# Patient Record
Sex: FEMALE | URBAN - METROPOLITAN AREA
[De-identification: names, ages, dates, MRNs, and addresses within clinical notes are randomized per-mention and may not be internally consistent; named-entity substitution may affect disease eponyms.]

---

## 2021-02-01 ENCOUNTER — HOSPITAL ENCOUNTER (INPATIENT)
Age: 31
LOS: 3 days | Discharge: HOME | DRG: 885 | End: 2021-02-04
Attending: PSYCHIATRY & NEUROLOGY | Admitting: PSYCHIATRY & NEUROLOGY
Payer: COMMERCIAL

## 2021-02-01 PROBLEM — F32.A DEPRESSION WITH SUICIDAL IDEATION: Status: ACTIVE | Noted: 2021-02-01

## 2021-02-01 PROBLEM — R45.851 DEPRESSION WITH SUICIDAL IDEATION: Status: ACTIVE | Noted: 2021-02-01

## 2021-02-01 PROCEDURE — 1240000000 HC EMOTIONAL WELLNESS R&B

## 2021-02-01 PROCEDURE — 6370000000 HC RX 637 (ALT 250 FOR IP): Performed by: PSYCHIATRY & NEUROLOGY

## 2021-02-01 RX ORDER — ACETAMINOPHEN 325 MG/1
650 TABLET ORAL EVERY 4 HOURS PRN
Status: DISCONTINUED | OUTPATIENT
Start: 2021-02-01 | End: 2021-02-04 | Stop reason: HOSPADM

## 2021-02-01 RX ORDER — ARIPIPRAZOLE 5 MG/1
5 TABLET ORAL DAILY
Status: DISCONTINUED | OUTPATIENT
Start: 2021-02-01 | End: 2021-02-04 | Stop reason: HOSPADM

## 2021-02-01 RX ORDER — NICOTINE 21 MG/24HR
1 PATCH, TRANSDERMAL 24 HOURS TRANSDERMAL DAILY
Status: DISCONTINUED | OUTPATIENT
Start: 2021-02-01 | End: 2021-02-03

## 2021-02-01 RX ORDER — OMEPRAZOLE 20 MG/1
20 CAPSULE, DELAYED RELEASE ORAL DAILY
COMMUNITY

## 2021-02-01 RX ORDER — MAGNESIUM HYDROXIDE/ALUMINUM HYDROXICE/SIMETHICONE 120; 1200; 1200 MG/30ML; MG/30ML; MG/30ML
30 SUSPENSION ORAL EVERY 6 HOURS PRN
Status: DISCONTINUED | OUTPATIENT
Start: 2021-02-01 | End: 2021-02-04 | Stop reason: HOSPADM

## 2021-02-01 RX ORDER — OMEPRAZOLE 20 MG/1
20 CAPSULE, DELAYED RELEASE ORAL DAILY
Status: DISCONTINUED | OUTPATIENT
Start: 2021-02-01 | End: 2021-02-04 | Stop reason: HOSPADM

## 2021-02-01 RX ORDER — IBUPROFEN 400 MG/1
400 TABLET ORAL EVERY 6 HOURS PRN
Status: DISCONTINUED | OUTPATIENT
Start: 2021-02-01 | End: 2021-02-04 | Stop reason: HOSPADM

## 2021-02-01 RX ORDER — LISINOPRIL 10 MG/1
10 TABLET ORAL DAILY
Status: DISCONTINUED | OUTPATIENT
Start: 2021-02-01 | End: 2021-02-04 | Stop reason: HOSPADM

## 2021-02-01 RX ORDER — POLYETHYLENE GLYCOL 3350 17 G/17G
17 POWDER, FOR SOLUTION ORAL DAILY PRN
Status: DISCONTINUED | OUTPATIENT
Start: 2021-02-01 | End: 2021-02-04 | Stop reason: HOSPADM

## 2021-02-01 RX ORDER — HYDROXYZINE 50 MG/1
50 TABLET, FILM COATED ORAL 3 TIMES DAILY PRN
Status: DISCONTINUED | OUTPATIENT
Start: 2021-02-01 | End: 2021-02-04 | Stop reason: HOSPADM

## 2021-02-01 RX ORDER — LISINOPRIL 10 MG/1
10 TABLET ORAL DAILY
COMMUNITY

## 2021-02-01 RX ORDER — PAROXETINE 30 MG/1
30 TABLET, FILM COATED ORAL EVERY MORNING
Status: ON HOLD | COMMUNITY
End: 2021-02-04 | Stop reason: HOSPADM

## 2021-02-01 RX ORDER — TRAZODONE HYDROCHLORIDE 50 MG/1
50 TABLET ORAL NIGHTLY PRN
Status: DISCONTINUED | OUTPATIENT
Start: 2021-02-01 | End: 2021-02-04 | Stop reason: HOSPADM

## 2021-02-01 RX ORDER — ARIPIPRAZOLE 5 MG/1
5 TABLET ORAL DAILY
COMMUNITY

## 2021-02-01 RX ADMIN — TRAZODONE HYDROCHLORIDE 50 MG: 50 TABLET ORAL at 21:22

## 2021-02-01 RX ADMIN — ARIPIPRAZOLE 5 MG: 5 TABLET ORAL at 17:24

## 2021-02-01 RX ADMIN — HYDROXYZINE HYDROCHLORIDE 50 MG: 50 TABLET, FILM COATED ORAL at 21:22

## 2021-02-01 RX ADMIN — LISINOPRIL 10 MG: 10 TABLET ORAL at 17:24

## 2021-02-01 SDOH — HEALTH STABILITY: MENTAL HEALTH: HOW OFTEN DO YOU HAVE A DRINK CONTAINING ALCOHOL?: NEVER

## 2021-02-01 ASSESSMENT — SLEEP AND FATIGUE QUESTIONNAIRES
DO YOU USE A SLEEP AID: NO
DIFFICULTY STAYING ASLEEP: YES
SLEEP PATTERN: DISTURBED/INTERRUPTED SLEEP;RESTLESSNESS
RESTFUL SLEEP: NO
DO YOU HAVE DIFFICULTY SLEEPING: YES
DIFFICULTY ARISING: YES
DIFFICULTY FALLING ASLEEP: YES

## 2021-02-01 ASSESSMENT — LIFESTYLE VARIABLES: HISTORY_ALCOHOL_USE: NO

## 2021-02-01 NOTE — BH NOTE
`[facility] [place]  Admission Note     Admission Type:   Admission Type: Voluntary    Reason for admission:  Reason for Admission: suicidal ideations    PATIENT STRENGTHS:  Strengths: Communication, Positive Support, Social Skills, Motivated, Employment, Medication Compliance, Connection to output provider    Patient Strengths and Limitations:  Limitations: Tendency to isolate self    Addictive Behavior:   Addictive Behavior  In the past 3 months, have you felt or has someone told you that you have a problem with:  : None  Do you have a history of Chemical Use?: No  Do you have a history of Alcohol Use?: No  Do you have a history of Street Drug Abuse?: No  Histroy of Prescripton Drug Abuse?: No    Medical Problems:   Past Medical History:   Diagnosis Date    GERD (gastroesophageal reflux disease)     Hypertension        Status EXAM:  Status and Exam  Normal: No  Facial Expression: Flat  Affect: Appropriate  Level of Consciousness: Alert  Mood:Normal: No  Mood: Depressed, Anxious  Motor Activity:Normal: No  Motor Activity: Decreased  Interview Behavior: Cooperative  Preception: Macy to Person, Macy to Time, Macy to Place, Macy to Situation  Attention:Normal: No  Attention: Distractible  Thought Processes: Circumstantial  Thought Content:Normal: Yes  Hallucinations: None  Delusions: No  Memory:Normal: Yes  Insight and Judgment: No  Insight and Judgment: Poor Insight, Poor Judgment  Present Suicidal Ideation: No  Present Homicidal Ideation: No    Tobacco Screening:  Practical Counseling, on admission, toni X, if applicable and completed (first 3 are required if patient doesn't refuse):            ( )  Recognizing danger situations (included triggers and roadblocks)                    ( )  Coping skills (new ways to manage stress, exercise, relaxation techniques, changing routine, distraction) ( )  Basic information about quitting (benefits of quitting, techniques in how to quit, available resources  ( ) Referral for counseling faxed to [place]                                           ( ) Patient refused counseling  ( ) Patient has not smoked in the last 30 days    Metabolic Screening:    No results found for: LABA1C    No results found for: CHOL  No results found for: TRIG  No results found for: HDL  No components found for: LDLCAL  No results found for: LABVLDL      Body mass index is 46.21 kg/m². BP Readings from Last 2 Encounters:   02/01/21 (!) 140/90           Pt admitted with followings belongings:  Dentures: None  Vision - Corrective Lenses: None  Hearing Aid: None  Jewelry: Watch  Body Piercings Removed: N/A  Clothing: Footwear, Jacket / coat, Pants, Shirt, Socks, Undergarments (Comment)(belt, face mask)  Were All Patient Medications Collected?: Not Applicable  Other Valuables: Cell phone, Money (Comment), Desiree Calico, Other (Comment)     Valuables sent home with n/a. Valuables placed in safe in security envelope, number:  [id number]. Patient's home medications were verified by [address] [place] [address] [place] in . Patient oriented to surroundings and program expectations and copy of patient rights given. Received admission packet:  yes. Consents reviewed, signed yes. Refused no. Patient verbalize understanding:  yes. Patient education on precautions: yes  Patient admitted [facility] unit from [facility]. Patient admitted for suicidal ideations with plan to overdose on her medications. Upon admission patient with flat affect. Denies suicidal/homicidal/self harm ideations but endorses depressed. anxious mood. Patient verbalizes increased depression from a 14 year friendship that ended. Patient wanded and oriented to unit and assigned room. Food and fluids provided. Safety maintained.                     Cheyenne Tarango RN

## 2021-02-01 NOTE — GROUP NOTE
Group Therapy Note    Date: 2/1/2021    Group Start Time: 1430  Group End Time: 0451  Group Topic: Cognitive Skills    CALEB Castellano, MARCYS    Pt did not attend 1430 cognitive skills group d/t resting in room despite staff invitation to attend. 1:1 talk time offered as alternative to group session, pt declined.               Signature:  Gerda Gabriel

## 2021-02-01 NOTE — BH NOTE
Group Therapy Note      Date: 2/1/21     Group Start Time: 1600     Group Topic: wellness, coping skills     [facility] D      Patient refused to attend nursing wellness group after encouragement from staff. 1:1 talk time offered but refused.       Discipline Responsible: behavioral health tech

## 2021-02-01 NOTE — LETTER
[address] [facility] [phone]  Phone: [phone]      February 4, 2021     Patient: Juju Castro   YOB: 1990   Date of Visit: 1/31/2021-2/4/2021       To Whom It May Concern: It is my medical opinion that Juju Castro may return to work on 2/5/2021 with the following restrictions: no restrictions. If you have any questions or concerns, please don't hesitate to call.     Sincerely,        Ginger CHAVEZ-CNP

## 2021-02-02 PROCEDURE — 6370000000 HC RX 637 (ALT 250 FOR IP): Performed by: PSYCHIATRY & NEUROLOGY

## 2021-02-02 PROCEDURE — 99223 1ST HOSP IP/OBS HIGH 75: CPT | Performed by: PSYCHIATRY & NEUROLOGY

## 2021-02-02 PROCEDURE — 1240000000 HC EMOTIONAL WELLNESS R&B

## 2021-02-02 RX ADMIN — PAROXETINE HYDROCHLORIDE 30 MG: 20 TABLET, FILM COATED ORAL at 08:21

## 2021-02-02 RX ADMIN — LISINOPRIL 10 MG: 10 TABLET ORAL at 08:22

## 2021-02-02 RX ADMIN — OMEPRAZOLE 20 MG: 20 CAPSULE, DELAYED RELEASE ORAL at 08:22

## 2021-02-02 RX ADMIN — IBUPROFEN 400 MG: 400 TABLET, FILM COATED ORAL at 04:48

## 2021-02-02 RX ADMIN — ARIPIPRAZOLE 5 MG: 5 TABLET ORAL at 08:22

## 2021-02-02 ASSESSMENT — PAIN SCALES - GENERAL: PAINLEVEL_OUTOF10: 8
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
## 2021-02-02 NOTE — GROUP NOTE
Group Therapy Note    Date: 2/2/2021    Group Start Time: 1349  Group End Time: 0915  Group Topic: Community Meeting    CALEB Brizuela [place]        Group Therapy Note    Attendees: 9/17         Patient's Goal:  To increase social interaction and to explore and identify short term goals r/t wellness and recovery. RT discussed group schedule and unit structure/resources and encouraged pts to be engaged in their treatment. Pts were given opportunities to ask questions. Notes: Pt participated in group task and was able to identify short term goals r/t wellness and recovery. Pt is pleasant and supportive of peers        Status After Intervention:  Improved     Participation Level:  Active Listener and Interactive     Participation Quality: Appropriate, Attentive, Sharing         Speech:   Normal      Thought Process/Content: Logical,linear     Affective Functioning: Blunted     Mood: euthymic      Level of consciousness:  Alert, and Attentive        Response to Learning: Able to verbalize current knowledge/experience, Able to verbalize/acknowledge new learning, and Progressing to goal        Endings: None Reported     Modes of Intervention: Education, Support, Socialization, Exploration, Clarifying and Problem-solving        Discipline Responsible: Psychoeducational Specialist        Signature:  Linda Walls

## 2021-02-02 NOTE — GROUP NOTE
Group Therapy Note    Date: 2/2/2021    Group Start Time: 1100  Group End Time: 1952  Group Topic: Cognitive Skills    CALEB Rivera, CTRS        Group Therapy Note    Attendees: 10/18       Pt did not participate in Cognitive Skills Group at 1100AM when encouraged by RT due to resting in room. Pt was offered talk time as an alternative to group but declined.        Discipline Responsible: Psychoeducational Specialist      Signature:  Sherron Gordon
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
## 2021-02-02 NOTE — GROUP NOTE
Group Therapy Note    Date: 2/2/2021    Group Start Time: 1330  Group End Time: 2795  Group Topic: Cognitive Skills    YVES Raymond      Pt did not attend 1330 cognitive skills group d/t resting in room despite staff invitation to attend. 1:1 talk time offered as alternative to group session, pt declined.             Signature:  Miller Guzman

## 2021-02-02 NOTE — PLAN OF CARE
PATIENT GOALS: to be discussed with patient within 72 hours    PLAN/TREATMENT RECOMMENDATIONS:     continue group therapy , medications compliance, goal setting, individualized assessments and care, continue to monitor pt on unit      SHORT-TERM GOALS:   Time frame for Short-Term Goals: 5-7 days    LONG-TERM GOALS:  Time frame for Long-Term Goals: 6 months  Members Present in Team Meeting: See Signature Sheet    LENARD Hammonds

## 2021-02-02 NOTE — PLAN OF CARE
Problem: Altered Mood, Depressive Behavior:  Goal: Able to verbalize and/or display a decrease in depressive symptoms  Description: Able to verbalize and/or display a decrease in depressive symptoms  Outcome: Ongoing     Problem: Altered Mood, Depressive Behavior:  Goal: Ability to disclose and discuss suicidal ideas will improve  Description: Ability to disclose and discuss suicidal ideas will improve  Outcome: Ongoing  Note: Denies wanting to hurt self of others at this time. States she came in because she had the thought and it scared her. She is glad to be getting help.      Problem: Altered Mood, Depressive Behavior:  Goal: Able to verbalize support systems  Description: Able to verbalize support systems  Outcome: Ongoing

## 2021-02-02 NOTE — GROUP NOTE
Group Therapy Note    Date: 2/2/2021    Group Start Time: 1430  Group End Time: 0697  Group Topic: Cognitive Skills    CALEB Felipe, MARCYS        Group Therapy Note    Attendees: 8/14         Pt did not participate in Cognitive Skills Group at 1430 when encouraged by RT due to resting in room. Pt was offered talk time as an alternative to group but declined.        Discipline Responsible: Psychoeducational Specialist      Signature:  Marion Lopes

## 2021-02-02 NOTE — SUICIDE SAFETY PLAN
SAFETY PLAN    A suicide Safety Plan is a document that supports someone when they are having thoughts of suicide. Warning Signs that indicate a suicidal crisis may be developing: What (situations, thoughts, feelings, body sensations, behaviors, etc.) do you experience that lets you know you are beginning to think about suicide? 1. Go off medications  2. Mood is depressed and start to feel sad, hopeless, helpless, guilty, decline in self-esteem, excess worry, no interest in doing any pleasurable activities, unable to concentrate  3. Begin to cry over the smallest of things  4. Not eating or sleeping as normal  5. Relationship issues start happening  6. I become angry and start a fight  7. When I dont listen or respond to people in a good, positive way  8. Increase drug use   Internal Coping Strategies:  What things can I do (relaxation techniques, hobbies, physical activities, etc.) to take my mind off my problems without contacting another person? 1. Go to hospital discharge appointments and follow-up with community mental health counseling  2. Talk with other people  3. Learn to identify and control your emotions by new ways  4. Think before you speak or act; walk away from the situation  5. Join a support group in person or on Social Media  6. Take a time-out  7. Take deep breaths; use relaxation techniques  8. Get some exercise; go for a walk  9. Read; listen to music; watch a funny movie   People and social settings that provide distraction: Who can I call or where can I go to distract me? Joaquin [phone]   People whom I can ask for help: Who can I call when I need help - for example, friends, family, clergy, someone else?   [place] [facility]  Dr Ophelia Liang,  [phone] Professionals or [address] [address] [facility] [address] I can contact during a crisis: Who can I call for help - for example, my doctor, my psychiatrist, my psychologist, a mental health provider, a suicide hotline? [employer], psychiatrist, [place] or therapist  Phone: [phone]  Suicide Prevention Lifeline: 7-113-713-TLDD (6497)  [facility] 2-1-11 (089) 751-6004 or (838) 281-7026  [facility] [address] [place] [facility], 24-hour Crisis Services, Central Access: (118) [phone], [address] [facility], [address]  KETURAH (National Association of Mental Illness) groups and support, [address] [facility] [address] [phone]  4. [address] [place] [address] Emergency Services -  for example, Community Mental        ? [place], [place] (0736)  ? [facility] 2-1-10 (400) 566-1494 or (580) 672-6789  ? [facility], 843.832.6779 or 9-1-1  ? [facility], [address] De Leon [place], 25 [facility]3] 201 [phone]  ? [facility] Substance Abuse [employer], 5-056-726-HELP (3540)  ? Crisis Text Line, Text 4HOPE to 830041 to connect with a crisis counselor  ? [address] [place] [address], 9-543.185.1253  ? [place] ([place]), [phone]  Making the environment safe: How can I make my environment (house/apartment/living space) safer? For example, can I remove guns, medications, and other items? 1. Throw away all medications not being taken  2.  Plan daily goals to help remember to stay on specific medications

## 2021-02-03 PROCEDURE — 6370000000 HC RX 637 (ALT 250 FOR IP): Performed by: PSYCHIATRY & NEUROLOGY

## 2021-02-03 PROCEDURE — 1240000000 HC EMOTIONAL WELLNESS R&B

## 2021-02-03 PROCEDURE — 99232 SBSQ HOSP IP/OBS MODERATE 35: CPT | Performed by: PSYCHIATRY & NEUROLOGY

## 2021-02-03 RX ORDER — PAROXETINE HYDROCHLORIDE 40 MG/1
40 TABLET, FILM COATED ORAL EVERY MORNING
Status: DISCONTINUED | OUTPATIENT
Start: 2021-02-04 | End: 2021-02-04 | Stop reason: HOSPADM

## 2021-02-03 RX ADMIN — IBUPROFEN 400 MG: 400 TABLET, FILM COATED ORAL at 06:54

## 2021-02-03 RX ADMIN — ARIPIPRAZOLE 5 MG: 5 TABLET ORAL at 07:46

## 2021-02-03 RX ADMIN — OMEPRAZOLE 20 MG: 20 CAPSULE, DELAYED RELEASE ORAL at 07:43

## 2021-02-03 RX ADMIN — IBUPROFEN 400 MG: 400 TABLET, FILM COATED ORAL at 22:48

## 2021-02-03 RX ADMIN — TRAZODONE HYDROCHLORIDE 50 MG: 50 TABLET ORAL at 21:22

## 2021-02-03 RX ADMIN — LISINOPRIL 10 MG: 10 TABLET ORAL at 07:46

## 2021-02-03 RX ADMIN — PAROXETINE HYDROCHLORIDE 30 MG: 20 TABLET, FILM COATED ORAL at 07:43

## 2021-02-03 ASSESSMENT — ENCOUNTER SYMPTOMS
SORE THROAT: 0
COUGH: 0
BACK PAIN: 1
RHINORRHEA: 0
SHORTNESS OF BREATH: 0
GASTROINTESTINAL NEGATIVE: 1

## 2021-02-03 ASSESSMENT — PAIN SCALES - GENERAL
PAINLEVEL_OUTOF10: 5
PAINLEVEL_OUTOF10: 0
PAINLEVEL_OUTOF10: 6

## 2021-02-03 NOTE — PROGRESS NOTES
Daily Progress Note  2/3/2021     CHIEF COMPLAINT: Suicidal ideation with plans to overdose    Reviewed patient's current plan of care and vital signs with nursing staff. Sleep: Patient reports approximately 7 hours last night of restless sleep  Attending groups: Yes    SUBJECTIVE:    Patient has maintained medication adherence and has utilized Motrin as needed related to back pain that she associates with the discomfort of available bed. Patient offers that her mood is improved and she is comfortable in acute care setting and attending milieu programming. Discussed that attending groups has been beneficial and she plans on establishing therapeutic alliance with a counselor at discharge. Discussed the benefits of DBT and offered that [facility] [place] would be an appropriate resource as patient lives at [facility]. Discussed with patient the potential of increasing her dosage of Paxil and while she has been taking this several years and it has been beneficial, she is accepting that long-term use can require adjustment and she is willing to titrate tomorrow morning. Patient offers that after discussion with her , and a family member who works in mental health she questions if her symptoms are related to premenstrual dysphoric disorder, and patient is offered that her current medication paroxetine is FDA approved to treat PMDD. Patient agrees that she will continue to explore this on outpatient basis. She denies having any additional questions or concerns related to her current treatment plan and is forward thinking related to discharge, intending to notify her place of employment that she remains hospitalized with intentions to return to her job as soon as possible.     Mental Status Exam  Level of consciousness:  Within normal limits  Appearance: Personal attire, seated in chair, with good grooming and hygiene   Behavior/Motor: Approachable, no psychomotor abnormalities noted Attitude toward examiner:  Cooperative, attentive, good eye contact  Speech:  spontaneous, normal rate, normal volume and well articulated  Mood: \"Better \"  Affect: Mood congruent  Thought processes:  linear, goal directed and coherent  Thought content:  denies homicidal ideation  Suicidal Ideation: Endorses improved suicidal ideation with no current plan or intent and contracts for safety  Delusions:  no evidence of delusions  Perceptual Disturbance:  denies any perceptual disturbance  Cognition:  Oriented to self, location, time, and situation  Memory: age appropriate  Insight & Judgement: improving  Medication side effects:  denies       Data   height is 5' 3.5\" (1.613 m) and weight is 265 lb (120.2 kg). Her oral temperature is 97.8 °F (36.6 °C). Her blood pressure is 131/91 (abnormal) and her pulse is 96. Her respiration is 18 and oxygen saturation is 99%. Labs:   No results found for any previous visit.             Medications  Current Facility-Administered Medications: [START ON 2/4/2021] PARoxetine (PAXIL) tablet 40 mg, 40 mg, Oral, QAM  acetaminophen (TYLENOL) tablet 650 mg, 650 mg, Oral, Q4H PRN  aluminum & magnesium hydroxide-simethicone (MAALOX) 200-200-20 MG/5ML suspension 30 mL, 30 mL, Oral, Q6H PRN  hydrOXYzine (ATARAX) tablet 50 mg, 50 mg, Oral, TID PRN  ibuprofen (ADVIL;MOTRIN) tablet 400 mg, 400 mg, Oral, Q6H PRN  traZODone (DESYREL) tablet 50 mg, 50 mg, Oral, Nightly PRN  polyethylene glycol (GLYCOLAX) packet 17 g, 17 g, Oral, Daily PRN  nicotine polacrilex (NICORETTE) gum 2 mg, 2 mg, Oral, PRN  nicotine (NICODERM CQ) 14 MG/24HR 1 patch, 1 patch, Transdermal, Daily  ARIPiprazole (ABILIFY) tablet 5 mg, 5 mg, Oral, Daily  lisinopril (PRINIVIL;ZESTRIL) tablet 10 mg, 10 mg, Oral, Daily  omeprazole (PRILOSEC) delayed release capsule 20 mg, 20 mg, Oral, Daily    ASSESSMENT  Depression with suicidal ideation     PLAN  Discussed with Dr. Lysle Lombard and social work team   patient  symptoms are improving Increase paroxetine 40 mg daily (LD 2/3-paroxetine 30 mg)  Attempt to develop insight  Psycho-education conducted. Supportive Therapy conducted. Probable discharge per attending physician  Follow-up daily while on inpatient unit    Electronically signed by SCOTT Ford CNP on 2/3/21 at 12:55 PM EST    **This report has been created using voice recognition software. It may contain minor errors which are inherent in voice recognition technology. **I independently saw and evaluated the patient. I reviewed the midlevel provider's documentation above. Any additional comments or changes to the midlevel provider's documentation are stated below otherwise agree with assessment. The patient reports an improvement in her mood. She continues to be upset about the situation at work. We discussed that she has been taking her current dose of medication for some time. She was agreeable to increasing the dose of her Paxil which has been increased to 40 mg daily. PLAN  Medications as noted above  Attempt to develop insight  Psycho-education conducted. Supportive Therapy conducted.   Probable discharge is as noted above  Follow-up daily while on inpatient unit    Electronically signed by Bar Asif MD on 2/3/21 at 4:50 PM EST

## 2021-02-03 NOTE — PLAN OF CARE
Problem: Altered Mood, Depressive Behavior:  Goal: Able to verbalize and/or display a decrease in depressive symptoms  Description: Able to verbalize and/or display a decrease in depressive symptoms  2/2/2021 2045 by Gwendolyn Alvarez, RN  Outcome: Ongoing  Note: Patient denies suicidal ideation and verbally contracts for safety. Patient remains safe during Q15 min and random safety checks. Patient remains in hospital appropriate clothing at all times and free from harmful objects. Patient is accepting of talk time with writer. Denies any thoughts to harm others, denies any hallucinations. States she is sleeping okay but states she has chronic back pain and the beds are not the best for her back.

## 2021-02-03 NOTE — PLAN OF CARE
[facility]  Day 3 Interdisciplinary Treatment Plan NOTE    Review Date & Time: 2/3/2021                    1300    Admission Type:   Admission Type: Involuntary    Reason for admission:  Reason for Admission: SI no plan  Estimated Length of Stay: 5-7 days  Estimated Discharge Date Update: to be determined by physician    PATIENT STRENGTHS:  Patient Strengths Strengths: Positive Support, No significant Physical Illness  Patient Strengths and Limitations:Limitations: Tendency to isolate self, Lacks leisure interests, Difficulty problem solving/relies on others to help solve problems, Hopeless about future, Multiple barriers to leisure interests, Inappropriate/potentially harmful leisure interests (depression substance abuse anxiety poor coping skills)  Addictive Behavior:Addictive Behavior  In the past 3 months, have you felt or has someone told you that you have a problem with:  : None  Do you have a history of Chemical Use?: No  Do you have a history of Alcohol Use?: No  Do you have a history of Street Drug Abuse?: Yes  Histroy of Prescripton Drug Abuse?: No  Medical Problems:No past medical history on file.     Risk:  Fall RiskTotal: 53  Kana Scale Kana Scale Score: 22  BVC Total: 0  Change in scores no Changes to plan of Care no    Status EXAM:   Status and Exam  Normal: No  Facial Expression: Elevated  Affect: Inappropriate  Level of Consciousness: Alert  Mood:Normal: No  Mood: Depressed, Anxious  Motor Activity:Normal: No  Motor Activity: Decreased  Interview Behavior: Cooperative  Preception: Sayre to Person, Debborah Saupe to Time, Sayre to Place, Sayre to Situation  Attention:Normal: Yes  Attention: Distractible  Thought Processes: Circumstantial  Thought Content:Normal: Yes  Thought Content: Preoccupations  Hallucinations: None  Delusions: No  Memory:Normal: Yes  Memory: Poor Recent, Confabulation  Insight and Judgment: No  Insight and Judgment: Unmotivated  Present Suicidal Ideation: No Present Homicidal Ideation: No    Daily Assessment Last Entry:   Daily Sleep (WDL): Within Defined Limits         Patient Currently in Pain: No  Daily Nutrition (WDL): Within Defined Limits    Patient Monitoring:  Frequency of Checks: 4 times per hour, close    Psychiatric Symptoms:   Depression Symptoms  Depression Symptoms: Isolative, Loss of interest  Anxiety Symptoms  Anxiety Symptoms: Generalized  Larisa Symptoms  Larisa Symptoms: No problems reported or observed. Psychosis Symptoms  Delusion Type: No problems reported or observed. Suicide Risk CSSR-S:  Have you wished you were dead or wished you could go to sleep and not wake up? : NO  Have you actually had any thoughts of killing yourself? : NO  Have you ever done anything, started to do anything, or prepared to do anything to end your life?: NO  Change in Result              no              Change in Plan of care     no      EDUCATION:   EDUCATION:   Learner Progress Toward Treatment Goals: Reviewed results and recommendations of this team, Reviewed group plan and strategies, Reviewed signs, symptoms and risk of self harm and violent behavior, Reviewed goals and plan of care    Method:small group, individual verbal education    Outcome:verbalized by patient, but needs reinforcement to obtain goals    PATIENT GOALS:  Short term: \"learn better ways to cope depression, especially when I'm alone or at work\".   Long term : \" talk and spend quality time with \"     PLAN/TREATMENT RECOMMENDATIONS UPDATE: continue with group therapies, increased socialization, continue planning for after discharge goals, continue with medication compliance    SHORT-TERM GOALS UPDATE:   Time frame for Short-Term Goals: 5-7 days    LONG-TERM GOALS UPDATE:   Time frame for Long-Term Goals: 6 months  Members Present in Team Meeting: See Signature Sheet    Bandar Agarwal

## 2021-02-03 NOTE — GROUP NOTE
Group Therapy Note    Date: 2/3/2021    Group Start Time: 0900  Group End Time: 3862  Group Topic: Community Meeting    YVES Orellana    Pt did not attend 0900 community meeting/ goal setting group d/t resting in room despite staff invitation to attend. 1:1 talk time offered as alternative to group session, pt declined.           Signature:  Barbara Sullivan

## 2021-02-03 NOTE — GROUP NOTE
Group Therapy Note    Date: 2/3/2021    Group Start Time: 1000  Group End Time: 5220  Group Topic: Cognitive Skills    CALEB Felipe, MARCYS        Group Therapy Note    Attendees: 8/15         Pt did not participate in Cognitive Skills Group at 1000am when encouraged by RT due to resting in room. Pt was offered talk time as an alternative to group but declined.        Discipline Responsible: Psychoeducational Specialist      Signature:  Marion Lopes
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
## 2021-02-03 NOTE — GROUP NOTE
Group Therapy Note    Date: 2/2/2021    Group Start Time: 2015  Group End Time: 2041  Group Topic: Relaxation    [facility]    Consuelo Rowe        Group Therapy Note    Attendees: 14/16           Patient's Goal:  Relaxation and Socialization    Notes:  Pt was appropriate throughout group. Pt was pleasant and polite, listening to peers and responding in kind. Status After Intervention:  Improved    Participation Level:  Active Listener and Interactive    Participation Quality: Appropriate and Attentive      Speech:  normal      Thought Process/Content: Logical  Linear      Affective Functioning: Congruent      Mood: Appropriate      Level of consciousness:  Alert, Oriented x4 and Attentive      Response to Learning: Progressing to goal      Endings: None Reported    Modes of Intervention: Support and Socialization      Discipline Responsible: Leonidas Nelson

## 2021-02-03 NOTE — GROUP NOTE
Group Therapy Note    Date: 2/3/2021    Group Start Time: 1100  Group End Time: 2900  Group Topic: Coping skills     MARCY BradenS        Group Therapy Note    Attendees: 9         Patient's Goal:  To improve coping skills     Notes:  Pt was pleasant and cooperative    Status After Intervention:  Improved    Participation Level:  Active Listener and Interactive    Participation Quality: Appropriate, Attentive and Sharing      Speech:  normal      Thought Process/Content: Logical      Affective Functioning: flat      Mood: depressed       Level of consciousness:  Alert and Oriented x4      Response to Learning: Able to verbalize current knowledge/experience and Progressing to goal      Endings: None Reported    Modes of Intervention: Education, Support and Problem-solving      Discipline Responsible: Psychoeducational Specialist      Signature:  Santos Callahan

## 2021-02-03 NOTE — PROGRESS NOTES
Pharmacy Med Education Group Note    Date: 2/3/21  Start Time: 1430  End Time: 1500    Number Participants in Group:  9    Goal:  Patient will demonstrate an understanding of the medications intended purpose and possible adverse effects  Topic: [place] for Pharmacy Med Ed Group    Discipline Responsible:     OT  AT  [facility] [employer] [employer] [facility] [employer].  RT     X Other       Participation Level:     None  Minimal      X Active Listener    X Interactive    Monopolizing         Participation Quality:    X Appropriate  Inappropriate     X       Attentive        Intrusive          Sharing        Resistant          Supportive        Lethargic       Affective:     X Congruent  Incongruent  Blunted  Flat    Constricted  Anxious  Elated  Angry    Labile  Depressed  Other         Cognitive:    X Alert  Oriented PPTP     Concentration   X G  F  P   Attention Span   X G  F  P   Short-Term Memory   X G  F  P   Long-Term Memory  G  F  P   ProblemSolving/  Decision Making  G  F  P   Ability to Process  Information   X G  F  P      Contributing Factors             Delusional             Hallucinating             Flight of Ideas             Other:       Modes of Intervention:    X Education   X Support  Exploration    Clarifying  Problem Solving  Confrontation    Socialization  Limit Setting  Reality Testing    Activity  Movement  Media    Other:            Response to Learning:    X Able to verbalize current knowledge/experience    Able to verbalize/acknowledge new learning    Able to retain information    Capable of insight    Able to change behavior    Progressing to goal    Other:        Comments:     Aliyah Nicole,PharmD,  2/3/2021, 3:09 PM

## 2021-02-04 VITALS
SYSTOLIC BLOOD PRESSURE: 118 MMHG | RESPIRATION RATE: 14 BRPM | HEIGHT: 64 IN | DIASTOLIC BLOOD PRESSURE: 79 MMHG | BODY MASS INDEX: 45.24 KG/M2 | HEART RATE: 94 BPM | OXYGEN SATURATION: 100 % | WEIGHT: 265 LBS | TEMPERATURE: 98 F

## 2021-02-04 PROCEDURE — 99239 HOSP IP/OBS DSCHRG MGMT >30: CPT | Performed by: PSYCHIATRY & NEUROLOGY

## 2021-02-04 PROCEDURE — 6370000000 HC RX 637 (ALT 250 FOR IP): Performed by: PSYCHIATRY & NEUROLOGY

## 2021-02-04 RX ORDER — PAROXETINE HYDROCHLORIDE 40 MG/1
40 TABLET, FILM COATED ORAL EVERY MORNING
Qty: 30 TABLET | Refills: 3 | Status: SHIPPED | OUTPATIENT
Start: 2021-02-05

## 2021-02-04 RX ADMIN — ARIPIPRAZOLE 5 MG: 5 TABLET ORAL at 08:37

## 2021-02-04 RX ADMIN — LISINOPRIL 10 MG: 10 TABLET ORAL at 08:37

## 2021-02-04 RX ADMIN — OMEPRAZOLE 20 MG: 20 CAPSULE, DELAYED RELEASE ORAL at 08:37

## 2021-02-04 RX ADMIN — PAROXETINE 40 MG: 40 TABLET, FILM COATED ORAL at 08:37

## 2021-02-04 NOTE — GROUP NOTE
Group Therapy Note    Date: 2/4/2021    Group Start Time: 1330  Group End Time: 5733  Group Topic: Cognitive Skills    [facility] Glennie Boas, CTRS        Group Therapy Note    Attendees: 6         Patient's Goal:  To improve coping skills     Notes:  Pt was pleasant and cooperative    Status After Intervention:  Improved    Participation Level:  Active Listener and Interactive    Participation Quality: Appropriate and Sharing      Speech:  normal      Thought Process/Content: Logical      Affective Functioning: Congruent      Mood: euthymic      Level of consciousness:  Alert and Oriented x4      Response to Learning: Able to verbalize current knowledge/experience and Progressing to goal      Endings: None Reported    Modes of Intervention: Education, Support and Problem-solving      Discipline Responsible: Psychoeducational Specialist      Signature:  Dar Pradhan
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
## 2021-02-04 NOTE — PLAN OF CARE
Problem: Altered Mood, Depressive Behavior:  Goal: Ability to disclose and discuss suicidal ideas will improve  Description: Ability to disclose and discuss suicidal ideas will improve  2/4/2021 1003 by Gera Ragsdale RN  Outcome: Ongoing  Note: Patient denies any thoughts of self harm or hallucinations. Medication compliant and behavior controlled.   Out for meals

## 2021-02-04 NOTE — GROUP NOTE
Group Therapy Note    Date: 2021    Group Start Time: 1430  Group End Time: 1500  Group Topic: Cognitive Skills    YVES Ferrera        Group Therapy Note    Attendees:          Patient's Goal:  ***    Notes:  ***    Status After Intervention:  {Status After Intervention:406203327}    Participation Level: {Participation Level:983168046}    Participation Quality: {[facility] PARTICIPATION QUALITY:642199619}      Speech:  {[facility] CD_SPEECH:60043}      Thought Process/Content: {Thought Process/Content:254372647}      Affective Functioning: {Affective Functionin}      Mood: {Mood:092524161}      Level of consciousness:  {Level of consciousness:397076847}      Response to Learning: {[facility] Responses to Learnin}      Endings: {[facility] Endings:10063}    Modes of Intervention: {[facility] Modes of Intervention:523017191}      Discipline Responsible: {[facility] Multidisciplinary:733638769}      Signature:  Darris Dance

## 2021-02-04 NOTE — PLAN OF CARE
Problem: Altered Mood, Depressive Behavior:  Goal: Able to verbalize and/or display a decrease in depressive symptoms  Description: Able to verbalize and/or display a decrease in depressive symptoms  2/3/2021 2149 by Cony Nova RN  Outcome: Ongoing  Note: Patient denies feelings of depression and anxiety at this time. Patient reports her mood has greatly improved. Patient is out and social with select others on the unit. Patient reports sleep and appetite are good. Problem: Altered Mood, Depressive Behavior:  Goal: Ability to disclose and discuss suicidal ideas will improve  Description: Ability to disclose and discuss suicidal ideas will improve  2/3/2021 2149 by Cony Nova RN  Outcome: Ongoing  Note: Patient denies experiencing suicidal ideations at this time. No self-harm has been noted. Patient remains safe on the unit. Safety checks remain in place every 15 minutes and as needed.

## 2021-02-04 NOTE — GROUP NOTE
Group Therapy Note    Date: 2/4/2021    Group Start Time: 0900  Group End Time: 0930  Group Topic: Community Meeting    [facility] [place]        Group Therapy Note    Attendees: 8/16           Patient's Goal:  To increase social interaction and to explore and identify short term goals r/t wellness and recovery. RT discussed group schedule and unit structure/resources and encouraged pts to be engaged in their treatment. Pts were given opportunities to ask questions. Notes: Pt participated in group task and was able to identify short term goals r/t wellness and recovery. Pt is pleasant and supportive of peers        Status After Intervention:  Improved     Participation Level:  Active Listener and Interactive     Participation Quality: Appropriate, Attentive, Sharing         Speech:   Normal     Thought Process/Content: Logical,linear     Affective Functioning: Blunted     Mood: euthymic        Level of consciousness:  Alert, and Attentive        Response to Learning: Able to verbalize current knowledge/experience, Able to verbalize/acknowledge new learning, and Progressing to goal        Endings: None Reported     Modes of Intervention: Education, Support, Socialization, Exploration, Clarifying and Problem-solving        Discipline Responsible: Psychoeducational Specialist        Signature:  Saira Mcmahan

## 2021-02-04 NOTE — GROUP NOTE
Group Therapy Note    Date: 2/4/2021    Group Start Time: 1000  Group End Time: 2112  Group Topic: Psychotherapy    [facility]    Norma Hoyt        Group Therapy Note    8/16         Patient's Goal:  PT will demonstrate increased interpersonal interaction and a clear understanding on multiple types of coping skills relating to the here-and-now therapeutic practice. Notes: Patient is making progress, AEB participating in group discussion, actively listening, and supporting other group members. PT participates in group and encourages others to participate     Status After Intervention:  Improved    Participation Level: Active Listener and Interactive    Participation Quality: Appropriate, Attentive and Sharing      Speech:  normal      Thought Process/Content: Logical      Affective Functioning: Flat      Mood: depressed      Level of consciousness:  Alert, Oriented x4 and Attentive      Response to Learning: Able to verbalize current knowledge/experience and Progressing to goal      Endings: None Reported    Modes of Intervention: Support, Socialization, Exploration, Clarifying and Problem-solving      Discipline Responsible: /Counselor      Signature:   Norma Hoyt